# Patient Record
Sex: FEMALE | Race: BLACK OR AFRICAN AMERICAN | Employment: UNEMPLOYED | URBAN - METROPOLITAN AREA
[De-identification: names, ages, dates, MRNs, and addresses within clinical notes are randomized per-mention and may not be internally consistent; named-entity substitution may affect disease eponyms.]

---

## 2019-05-02 ENCOUNTER — APPOINTMENT (OUTPATIENT)
Dept: CT IMAGING | Age: 24
End: 2019-05-02
Payer: MEDICARE

## 2019-05-02 ENCOUNTER — HOSPITAL ENCOUNTER (EMERGENCY)
Age: 24
Discharge: HOME | End: 2019-05-03
Attending: EMERGENCY MEDICINE
Payer: MEDICARE

## 2019-05-02 DIAGNOSIS — K52.9 GASTROENTERITIS: ICD-10-CM

## 2019-05-02 DIAGNOSIS — R10.84 GENERALIZED ABDOMINAL PAIN: Primary | ICD-10-CM

## 2019-05-02 DIAGNOSIS — N83.209 CYSTIC DISEASE OF OVARIES: ICD-10-CM

## 2019-05-02 LAB
A/G RATIO: 1.4 (ref 1.1–2.2)
ALBUMIN SERPL-MCNC: 4.5 G/DL (ref 3.4–5)
ALP BLD-CCNC: 65 U/L (ref 40–129)
ALT SERPL-CCNC: 20 U/L (ref 10–40)
ANION GAP SERPL CALCULATED.3IONS-SCNC: 10 MMOL/L (ref 3–16)
AST SERPL-CCNC: 22 U/L (ref 15–37)
BACTERIA: ABNORMAL /HPF
BASOPHILS ABSOLUTE: 0 K/UL (ref 0–0.2)
BASOPHILS RELATIVE PERCENT: 0.1 %
BILIRUB SERPL-MCNC: 0.6 MG/DL (ref 0–1)
BILIRUBIN URINE: NEGATIVE
BLOOD, URINE: ABNORMAL
BUN BLDV-MCNC: 9 MG/DL (ref 7–20)
CALCIUM SERPL-MCNC: 9.4 MG/DL (ref 8.3–10.6)
CHLORIDE BLD-SCNC: 102 MMOL/L (ref 99–110)
CLARITY: CLEAR
CO2: 27 MMOL/L (ref 21–32)
COLOR: YELLOW
CREAT SERPL-MCNC: 0.8 MG/DL (ref 0.6–1.1)
EOSINOPHILS ABSOLUTE: 0 K/UL (ref 0–0.6)
EOSINOPHILS RELATIVE PERCENT: 0.1 %
EPITHELIAL CELLS, UA: ABNORMAL /HPF
GFR AFRICAN AMERICAN: >60
GFR NON-AFRICAN AMERICAN: >60
GLOBULIN: 3.2 G/DL
GLUCOSE BLD-MCNC: 88 MG/DL (ref 70–99)
GLUCOSE URINE: NEGATIVE MG/DL
HCG(URINE) PREGNANCY TEST: NEGATIVE
HCT VFR BLD CALC: 36.2 % (ref 36–48)
HEMOGLOBIN: 12.2 G/DL (ref 12–16)
KETONES, URINE: NEGATIVE MG/DL
LEUKOCYTE ESTERASE, URINE: ABNORMAL
LIPASE: 13 U/L (ref 13–60)
LYMPHOCYTES ABSOLUTE: 0.8 K/UL (ref 1–5.1)
LYMPHOCYTES RELATIVE PERCENT: 8 %
MCH RBC QN AUTO: 31 PG (ref 26–34)
MCHC RBC AUTO-ENTMCNC: 33.8 G/DL (ref 31–36)
MCV RBC AUTO: 91.7 FL (ref 80–100)
MICROSCOPIC EXAMINATION: YES
MONOCYTES ABSOLUTE: 0.4 K/UL (ref 0–1.3)
MONOCYTES RELATIVE PERCENT: 4.5 %
MUCUS: ABNORMAL /LPF
NEUTROPHILS ABSOLUTE: 8.6 K/UL (ref 1.7–7.7)
NEUTROPHILS RELATIVE PERCENT: 87.3 %
NITRITE, URINE: NEGATIVE
PDW BLD-RTO: 12.8 % (ref 12.4–15.4)
PH UA: 6.5 (ref 5–8)
PLATELET # BLD: 236 K/UL (ref 135–450)
PMV BLD AUTO: 7.8 FL (ref 5–10.5)
POTASSIUM SERPL-SCNC: 4 MMOL/L (ref 3.5–5.1)
PROTEIN UA: NEGATIVE MG/DL
RBC # BLD: 3.95 M/UL (ref 4–5.2)
RBC UA: ABNORMAL /HPF (ref 0–2)
SODIUM BLD-SCNC: 139 MMOL/L (ref 136–145)
SPECIFIC GRAVITY UA: 1.01 (ref 1–1.03)
TOTAL PROTEIN: 7.7 G/DL (ref 6.4–8.2)
URINE REFLEX TO CULTURE: YES
URINE TYPE: ABNORMAL
UROBILINOGEN, URINE: 0.2 E.U./DL
WBC # BLD: 9.8 K/UL (ref 4–11)
WBC UA: ABNORMAL /HPF (ref 0–5)

## 2019-05-02 PROCEDURE — 80053 COMPREHEN METABOLIC PANEL: CPT

## 2019-05-02 PROCEDURE — 87086 URINE CULTURE/COLONY COUNT: CPT

## 2019-05-02 PROCEDURE — 81001 URINALYSIS AUTO W/SCOPE: CPT

## 2019-05-02 PROCEDURE — 2580000003 HC RX 258: Performed by: EMERGENCY MEDICINE

## 2019-05-02 PROCEDURE — 6360000002 HC RX W HCPCS: Performed by: EMERGENCY MEDICINE

## 2019-05-02 PROCEDURE — 84703 CHORIONIC GONADOTROPIN ASSAY: CPT

## 2019-05-02 PROCEDURE — 96375 TX/PRO/DX INJ NEW DRUG ADDON: CPT

## 2019-05-02 PROCEDURE — 74177 CT ABD & PELVIS W/CONTRAST: CPT

## 2019-05-02 PROCEDURE — 99284 EMERGENCY DEPT VISIT MOD MDM: CPT

## 2019-05-02 PROCEDURE — 85025 COMPLETE CBC W/AUTO DIFF WBC: CPT

## 2019-05-02 PROCEDURE — 96374 THER/PROPH/DIAG INJ IV PUSH: CPT

## 2019-05-02 PROCEDURE — 96361 HYDRATE IV INFUSION ADD-ON: CPT

## 2019-05-02 PROCEDURE — 83690 ASSAY OF LIPASE: CPT

## 2019-05-02 PROCEDURE — 6360000002 HC RX W HCPCS

## 2019-05-02 PROCEDURE — 6360000004 HC RX CONTRAST MEDICATION: Performed by: EMERGENCY MEDICINE

## 2019-05-02 RX ORDER — MORPHINE SULFATE 4 MG/ML
4 INJECTION, SOLUTION INTRAMUSCULAR; INTRAVENOUS ONCE
Status: COMPLETED | OUTPATIENT
Start: 2019-05-02 | End: 2019-05-02

## 2019-05-02 RX ORDER — MORPHINE SULFATE 4 MG/ML
INJECTION, SOLUTION INTRAMUSCULAR; INTRAVENOUS
Status: COMPLETED
Start: 2019-05-02 | End: 2019-05-02

## 2019-05-02 RX ORDER — KETOROLAC TROMETHAMINE 30 MG/ML
30 INJECTION, SOLUTION INTRAMUSCULAR; INTRAVENOUS ONCE
Status: COMPLETED | OUTPATIENT
Start: 2019-05-02 | End: 2019-05-02

## 2019-05-02 RX ORDER — ONDANSETRON 2 MG/ML
INJECTION INTRAMUSCULAR; INTRAVENOUS
Status: COMPLETED
Start: 2019-05-02 | End: 2019-05-02

## 2019-05-02 RX ORDER — 0.9 % SODIUM CHLORIDE 0.9 %
1000 INTRAVENOUS SOLUTION INTRAVENOUS ONCE
Status: COMPLETED | OUTPATIENT
Start: 2019-05-02 | End: 2019-05-03

## 2019-05-02 RX ORDER — ONDANSETRON 2 MG/ML
4 INJECTION INTRAMUSCULAR; INTRAVENOUS ONCE
Status: COMPLETED | OUTPATIENT
Start: 2019-05-02 | End: 2019-05-02

## 2019-05-02 RX ADMIN — IOPAMIDOL 80 ML: 755 INJECTION, SOLUTION INTRAVENOUS at 23:52

## 2019-05-02 RX ADMIN — SODIUM CHLORIDE 1000 ML: 9 INJECTION, SOLUTION INTRAVENOUS at 23:13

## 2019-05-02 RX ADMIN — ONDANSETRON 4 MG: 2 INJECTION INTRAMUSCULAR; INTRAVENOUS at 23:41

## 2019-05-02 RX ADMIN — MORPHINE SULFATE 4 MG: 4 INJECTION, SOLUTION INTRAMUSCULAR; INTRAVENOUS at 23:44

## 2019-05-02 RX ADMIN — KETOROLAC TROMETHAMINE 30 MG: 30 INJECTION, SOLUTION INTRAMUSCULAR at 23:13

## 2019-05-02 ASSESSMENT — PAIN DESCRIPTION - PAIN TYPE
TYPE: ACUTE PAIN
TYPE: ACUTE PAIN

## 2019-05-02 ASSESSMENT — PAIN DESCRIPTION - PROGRESSION: CLINICAL_PROGRESSION: GRADUALLY WORSENING

## 2019-05-02 ASSESSMENT — PAIN DESCRIPTION - DESCRIPTORS
DESCRIPTORS: PRESSURE
DESCRIPTORS: CONSTANT

## 2019-05-02 ASSESSMENT — PAIN DESCRIPTION - ONSET: ONSET: ON-GOING

## 2019-05-02 ASSESSMENT — PAIN SCALES - GENERAL
PAINLEVEL_OUTOF10: 6
PAINLEVEL_OUTOF10: 6
PAINLEVEL_OUTOF10: 8
PAINLEVEL_OUTOF10: 8

## 2019-05-02 ASSESSMENT — PAIN DESCRIPTION - FREQUENCY: FREQUENCY: CONTINUOUS

## 2019-05-02 ASSESSMENT — PAIN DESCRIPTION - LOCATION
LOCATION: ABDOMEN
LOCATION: ABDOMEN

## 2019-05-02 ASSESSMENT — PAIN DESCRIPTION - ORIENTATION
ORIENTATION: MID
ORIENTATION: MID;UPPER

## 2019-05-03 VITALS
BODY MASS INDEX: 25.56 KG/M2 | RESPIRATION RATE: 16 BRPM | SYSTOLIC BLOOD PRESSURE: 113 MMHG | DIASTOLIC BLOOD PRESSURE: 61 MMHG | HEART RATE: 84 BPM | WEIGHT: 149.7 LBS | HEIGHT: 64 IN | TEMPERATURE: 99.5 F | OXYGEN SATURATION: 100 %

## 2019-05-03 PROCEDURE — 6370000000 HC RX 637 (ALT 250 FOR IP): Performed by: EMERGENCY MEDICINE

## 2019-05-03 PROCEDURE — 96361 HYDRATE IV INFUSION ADD-ON: CPT

## 2019-05-03 RX ORDER — HYDROCODONE BITARTRATE AND ACETAMINOPHEN 5; 325 MG/1; MG/1
1 TABLET ORAL EVERY 6 HOURS PRN
Qty: 8 TABLET | Refills: 0 | Status: SHIPPED | OUTPATIENT
Start: 2019-05-03 | End: 2019-05-05

## 2019-05-03 RX ORDER — DICYCLOMINE HYDROCHLORIDE 10 MG/1
20 CAPSULE ORAL ONCE
Status: COMPLETED | OUTPATIENT
Start: 2019-05-03 | End: 2019-05-03

## 2019-05-03 RX ORDER — DICYCLOMINE HCL 20 MG
20 TABLET ORAL EVERY 6 HOURS
Qty: 40 TABLET | Refills: 0 | Status: SHIPPED | OUTPATIENT
Start: 2019-05-03

## 2019-05-03 RX ORDER — ONDANSETRON 4 MG/1
4 TABLET, ORALLY DISINTEGRATING ORAL EVERY 8 HOURS PRN
Qty: 15 TABLET | Refills: 0 | Status: SHIPPED | OUTPATIENT
Start: 2019-05-03

## 2019-05-03 RX ADMIN — DICYCLOMINE HYDROCHLORIDE 20 MG: 10 CAPSULE ORAL at 01:25

## 2019-05-03 ASSESSMENT — PAIN SCALES - GENERAL: PAINLEVEL_OUTOF10: 2

## 2019-05-03 NOTE — ED PROVIDER NOTES
CHIEF COMPLAINT  Abdominal Pain (c/o Pressure to upper abd that started early this am. Pain with sitting and walking. Feels better with laying down. c/o Slight dizziness earlier and had chills. Denies any burning with urination. Finished period about 2 days ago. Has not had a BM today. )      HISTORY OF PRESENT ILLNESS  Eric Barth is a 24 y.o. female with no significant past medical history who presents to the ED complaining of abdominal pain. Patient reports that she woke up this morning with diffuse mid to upper abdominal pain. Pain has worsened throughout the day and is now diffuse, but slightly worse over the upper right side. .  Patient denies fevers but has noted some chills. No urinary symptoms. No other complaints, modifying factors or associated symptoms. I have reviewed the following from the nursing documentation. Past Medical History:   Diagnosis Date    Sinus congestion     Staph infection      Past Surgical History:   Procedure Laterality Date    SINUS SURGERY       History reviewed. No pertinent family history. Social History     Socioeconomic History    Marital status: Single     Spouse name: Not on file    Number of children: Not on file    Years of education: Not on file    Highest education level: Not on file   Occupational History    Not on file   Social Needs    Financial resource strain: Not on file    Food insecurity:     Worry: Not on file     Inability: Not on file    Transportation needs:     Medical: Not on file     Non-medical: Not on file   Tobacco Use    Smoking status: Never Smoker    Smokeless tobacco: Never Used   Substance and Sexual Activity    Alcohol use:  Yes    Drug use: No    Sexual activity: Not on file   Lifestyle    Physical activity:     Days per week: Not on file     Minutes per session: Not on file    Stress: Not on file   Relationships    Social connections:     Talks on phone: Not on file     Gets together: Not on file     Attends Yazidism service: Not on file     Active member of club or organization: Not on file     Attends meetings of clubs or organizations: Not on file     Relationship status: Not on file    Intimate partner violence:     Fear of current or ex partner: Not on file     Emotionally abused: Not on file     Physically abused: Not on file     Forced sexual activity: Not on file   Other Topics Concern    Not on file   Social History Narrative    Not on file     Current Facility-Administered Medications   Medication Dose Route Frequency Provider Last Rate Last Dose    ketorolac (TORADOL) injection 30 mg  30 mg Intravenous Once Angle Mariela, DO        0.9 % sodium chloride bolus  1,000 mL Intravenous Once Angle Mariela, DO        iopamidol (ISOVUE-370) 76 % injection 80 mL  80 mL Intravenous ONCE PRN Angle Springer, DO         No current outpatient medications on file. No Known Allergies    REVIEW OF SYSTEMS  10 systems reviewed, pertinent positives per HPI otherwise noted to be negative. PHYSICAL EXAM  /66   Pulse 103   Temp 98.5 °F (36.9 °C) (Oral)   Resp 16   Ht 5' 4\" (1.626 m)   Wt 67.9 kg (149 lb 11.2 oz)   LMP 04/25/2019   SpO2 100%   BMI 25.70 kg/m²   GENERAL APPEARANCE: Awake and alert. Cooperative. No acute distress. HEAD: Normocephalic. Atraumatic. EYES: PERRL. EOM's grossly intact. ENT: Mucous membranes are moist.   NECK: Supple, trachea midline. HEART: RRR. Normal S1S2, no rubs, gallops, or murmurs noted  LUNGS: Respirations unlabored. CTAB. Good air exchange. No wheezes, rales, or rhonchi. Speaking comfortably in full sentences. ABDOMEN: Soft. Non-distended. Diffuse upper/right-sided abdominal pain. This is worse in the right upper quadrant. No CVA tenderness. No guarding or rebound. Normal bowel sounds. EXTREMITIES: No peripheral edema. MAEE. No acute deformities. SKIN: Warm and dry. No acute rashes. NEUROLOGICAL: Alert and oriented X 3. CN II-XII intact.  No gross facial drooping. Strength 5/5, sensation intact. Normal coordination. No pronator drift. Gait normal.   PSYCHIATRIC: Normal mood and affect. LABS  I have reviewed all labs for this visit.    Results for orders placed or performed during the hospital encounter of 05/02/19   Pregnancy, Urine   Result Value Ref Range    HCG(Urine) Pregnancy Test Negative Detects HCG level >20 MIU/mL   Urine Reflex to Culture   Result Value Ref Range    Color, UA Yellow Straw/Yellow    Clarity, UA Clear Clear    Glucose, Ur Negative Negative mg/dL    Bilirubin Urine Negative Negative    Ketones, Urine Negative Negative mg/dL    Specific Gravity, UA 1.010 1.005 - 1.030    Blood, Urine TRACE-INTACT (A) Negative    pH, UA 6.5 5.0 - 8.0    Protein, UA Negative Negative mg/dL    Urobilinogen, Urine 0.2 <2.0 E.U./dL    Nitrite, Urine Negative Negative    Leukocyte Esterase, Urine TRACE (A) Negative    Microscopic Examination YES     Urine Reflex to Culture Yes     Urine Type Not Specified    Microscopic Urinalysis   Result Value Ref Range    Mucus, UA 1+ (A) /LPF    WBC, UA 6-10 (A) 0 - 5 /HPF    RBC, UA 3-5 (A) 0 - 2 /HPF    Epi Cells 20-50 /HPF    Bacteria, UA 1+ (A) /HPF   CBC Auto Differential   Result Value Ref Range    WBC 9.8 4.0 - 11.0 K/uL    RBC 3.95 (L) 4.00 - 5.20 M/uL    Hemoglobin 12.2 12.0 - 16.0 g/dL    Hematocrit 36.2 36.0 - 48.0 %    MCV 91.7 80.0 - 100.0 fL    MCH 31.0 26.0 - 34.0 pg    MCHC 33.8 31.0 - 36.0 g/dL    RDW 12.8 12.4 - 15.4 %    Platelets 609 001 - 353 K/uL    MPV 7.8 5.0 - 10.5 fL    Neutrophils % 87.3 %    Lymphocytes % 8.0 %    Monocytes % 4.5 %    Eosinophils % 0.1 %    Basophils % 0.1 %    Neutrophils # 8.6 (H) 1.7 - 7.7 K/uL    Lymphocytes # 0.8 (L) 1.0 - 5.1 K/uL    Monocytes # 0.4 0.0 - 1.3 K/uL    Eosinophils # 0.0 0.0 - 0.6 K/uL    Basophils # 0.0 0.0 - 0.2 K/uL   Lipase   Result Value Ref Range    Lipase 13.0 13.0 - 60.0 U/L   Comprehensive Metabolic Panel   Result Value Ref Range    Sodium 139 136 - 145 mmol/L    Potassium 4.0 3.5 - 5.1 mmol/L    Chloride 102 99 - 110 mmol/L    CO2 27 21 - 32 mmol/L    Anion Gap 10 3 - 16    Glucose 88 70 - 99 mg/dL    BUN 9 7 - 20 mg/dL    CREATININE 0.8 0.6 - 1.1 mg/dL    GFR Non-African American >60 >60    GFR African American >60 >60    Calcium 9.4 8.3 - 10.6 mg/dL    Total Protein 7.7 6.4 - 8.2 g/dL    Alb 4.5 3.4 - 5.0 g/dL    Albumin/Globulin Ratio 1.4 1.1 - 2.2    Total Bilirubin 0.6 0.0 - 1.0 mg/dL    Alkaline Phosphatase 65 40 - 129 U/L    ALT 20 10 - 40 U/L    AST 22 15 - 37 U/L    Globulin 3.2 g/dL         RADIOLOGY  X-RAYS:  I have reviewed radiologic plain film image(s). ALL OTHER NON-PLAIN FILM IMAGES SUCH AS CT, ULTRASOUND AND MRI HAVE BEEN READ BY THE RADIOLOGIST. CT ABDOMEN PELVIS W IV CONTRAST Additional Contrast? None   Final Result     Fluid within nondistended loops of small bowel and within stomach    without bowel wall thickening or surrounding inflammation can be normal    or can be seen with gastroenteritis in the right clinical setting. Asymmetric cystic ovaries, right larger than left. Correlate for any    potential associated pain on the right side. Ultrasound may provide    additional useful information if concerned clinically. No acute or inflammatory disease or bowel obstruction. Rechecks: Physical assessment performed. 2313: Pain 6/10.    0100: Patient resting comfortably. ED COURSE/MDM  Patient seen and evaluated. Old records reviewed. Labs and imaging reviewed and results discussed with patient. Patient was given Toradol, morphine, Zofran, and Bentyl in the ED with good symptomatic relief. Patient was reassessed as noted above . Patient's labs appear stable. Imaging reveals concern for possible gastroenteritis. Clinically, this is most consistent with patient's current symptoms and diffuse pain.   We did discuss her CT results and concern for cystic ovaries as well as need for follow-up with ObGyn. We also discussed the possibility of developing biliary disease though her liver enzymes and pancreatic enzymes appear stable and there are no CT signs to suggest this as the etiology at this time. No acute pathology was noted and pt is safe for discharge home to follow up with PCP/ObGyn. Plan of care discussed with patient and family. Patient and family in agreement with plan. Patient was given scripts for the following medications. I counseled patient how to take these medications. New Prescriptions    DICYCLOMINE (BENTYL) 20 MG TABLET    Take 1 tablet by mouth every 6 hours    HYDROCODONE-ACETAMINOPHEN (NORCO) 5-325 MG PER TABLET    Take 1 tablet by mouth every 6 hours as needed for Pain for up to 2 days. ONDANSETRON (ZOFRAN ODT) 4 MG DISINTEGRATING TABLET    Take 1 tablet by mouth every 8 hours as needed for Nausea or Vomiting       CLINICAL IMPRESSION  1. Generalized abdominal pain    2. Gastroenteritis    3. Cystic disease of ovaries        Blood pressure 114/66, pulse 103, temperature 98.5 °F (36.9 °C), temperature source Oral, resp. rate 16, height 5' 4\" (1.626 m), weight 67.9 kg (149 lb 11.2 oz), last menstrual period 04/25/2019, SpO2 100 %, not currently breastfeeding. DISPOSITION  Danny Jesse was discharged to home in stable condition.         Sravanthi Okeefe DO  05/03/19 Maricruz Mcconnell

## 2019-05-04 LAB
ORGANISM: ABNORMAL
URINE CULTURE, ROUTINE: ABNORMAL
URINE CULTURE, ROUTINE: ABNORMAL

## 2021-06-22 ENCOUNTER — HOSPITAL ENCOUNTER (EMERGENCY)
Age: 26
Discharge: HOME | End: 2021-06-22
Payer: MEDICARE

## 2021-06-22 VITALS
HEIGHT: 65 IN | RESPIRATION RATE: 16 BRPM | TEMPERATURE: 97.9 F | WEIGHT: 156 LBS | SYSTOLIC BLOOD PRESSURE: 105 MMHG | HEART RATE: 77 BPM | BODY MASS INDEX: 25.99 KG/M2 | DIASTOLIC BLOOD PRESSURE: 64 MMHG | OXYGEN SATURATION: 99 %

## 2021-06-22 DIAGNOSIS — L03.012 PARONYCHIA OF FINGER OF LEFT HAND: Primary | ICD-10-CM

## 2021-06-22 DIAGNOSIS — S05.02XA ABRASION OF LEFT CORNEA, INITIAL ENCOUNTER: ICD-10-CM

## 2021-06-22 PROCEDURE — 99283 EMERGENCY DEPT VISIT LOW MDM: CPT

## 2021-06-22 PROCEDURE — 6370000000 HC RX 637 (ALT 250 FOR IP): Performed by: PHYSICIAN ASSISTANT

## 2021-06-22 RX ORDER — SULFAMETHOXAZOLE AND TRIMETHOPRIM 800; 160 MG/1; MG/1
1 TABLET ORAL 2 TIMES DAILY
Qty: 14 TABLET | Refills: 0 | Status: SHIPPED | OUTPATIENT
Start: 2021-06-22 | End: 2021-06-29

## 2021-06-22 RX ORDER — FLUCONAZOLE 150 MG/1
150 TABLET ORAL ONCE
Qty: 1 TABLET | Refills: 0 | Status: SHIPPED | OUTPATIENT
Start: 2021-06-22 | End: 2021-06-22

## 2021-06-22 RX ORDER — CEPHALEXIN 500 MG/1
500 CAPSULE ORAL 4 TIMES DAILY
Qty: 28 CAPSULE | Refills: 0 | Status: SHIPPED | OUTPATIENT
Start: 2021-06-22 | End: 2021-06-29

## 2021-06-22 RX ORDER — TETRACAINE HYDROCHLORIDE 5 MG/ML
1 SOLUTION OPHTHALMIC ONCE
Status: COMPLETED | OUTPATIENT
Start: 2021-06-22 | End: 2021-06-22

## 2021-06-22 RX ORDER — ERYTHROMYCIN 5 MG/G
OINTMENT OPHTHALMIC ONCE
Status: COMPLETED | OUTPATIENT
Start: 2021-06-22 | End: 2021-06-22

## 2021-06-22 RX ORDER — ERYTHROMYCIN 5 MG/G
OINTMENT OPHTHALMIC
Qty: 3.5 G | Refills: 0 | Status: SHIPPED | OUTPATIENT
Start: 2021-06-22 | End: 2021-07-02

## 2021-06-22 RX ADMIN — TETRACAINE HYDROCHLORIDE 1 DROP: 5 SOLUTION OPHTHALMIC at 11:47

## 2021-06-22 RX ADMIN — ERYTHROMYCIN: 5 OINTMENT OPHTHALMIC at 12:39

## 2021-06-22 RX ADMIN — FLUORESCEIN SODIUM 1 MG: 1 STRIP OPHTHALMIC at 11:46

## 2021-06-22 ASSESSMENT — PAIN DESCRIPTION - DESCRIPTORS: DESCRIPTORS: SHARP

## 2021-06-22 ASSESSMENT — PAIN DESCRIPTION - ONSET: ONSET: SUDDEN

## 2021-06-22 ASSESSMENT — PAIN DESCRIPTION - ORIENTATION: ORIENTATION: LEFT

## 2021-06-22 ASSESSMENT — PAIN DESCRIPTION - PROGRESSION: CLINICAL_PROGRESSION: GRADUALLY WORSENING

## 2021-06-22 ASSESSMENT — ENCOUNTER SYMPTOMS
EYE ITCHING: 0
BACK PAIN: 0
ABDOMINAL PAIN: 0
COLOR CHANGE: 1
EYE DISCHARGE: 0
SHORTNESS OF BREATH: 0
NAUSEA: 0
CHEST TIGHTNESS: 0
EYE REDNESS: 1
FACIAL SWELLING: 0
VOMITING: 0
EYE PAIN: 1
PHOTOPHOBIA: 0

## 2021-06-22 ASSESSMENT — PAIN - FUNCTIONAL ASSESSMENT: PAIN_FUNCTIONAL_ASSESSMENT: PREVENTS OR INTERFERES SOME ACTIVE ACTIVITIES AND ADLS

## 2021-06-22 ASSESSMENT — PAIN SCALES - GENERAL: PAINLEVEL_OUTOF10: 10

## 2021-06-22 ASSESSMENT — PAIN DESCRIPTION - LOCATION: LOCATION: EYE

## 2021-06-22 ASSESSMENT — PAIN DESCRIPTION - PAIN TYPE: TYPE: ACUTE PAIN

## 2021-06-22 NOTE — ED PROVIDER NOTES
[address] ENCOUNTER          PHYSICIAN ASSISTANT NOTE       Date of evaluation: 6/22/2021    Chief Complaint     Eye Pain (eye injury on 6/21) and Finger Injury (left pinky)    History of Present Illness     Umesh Yates is a 31 y.o. female with no known past medical history presenting to the emergency department for two separate complaints. First concern is that on Saturday night her acrylic nail pulled off of her normal nail on her left pinky finger. the following day she had increased pain on the lateral aspect of the nailbed and over the last few days she has had small amount of clear/purulent drainage from the edge of the nail with associated pain on the fingertip. No known trauma or blunt injury. No pain radiating into the finger or hand. Yesterday she was taking off the rest of her acrylic nails when a nail popped off and hit her in the left eye. She has had discomfort in the eye since this injury occurred. She wears glasses but is not currently wearing them, no corrective or cosmetic lenses. Last night she had difficulty sleeping due to eye discomfort, attempted to flush her eye without improvement. Mild blurry vision in the left eye, no symptoms in the right eye. No associated photophobia. Review of Systems     Review of Systems   Constitutional: Negative for chills, diaphoresis, fatigue and fever. HENT: Negative for facial swelling. Eyes: Positive for pain, redness and visual disturbance. Negative for photophobia, discharge and itching. Respiratory: Negative for chest tightness and shortness of breath. Cardiovascular: Negative for chest pain. Gastrointestinal: Negative for abdominal pain, nausea and vomiting. Musculoskeletal: Negative for arthralgias, back pain and myalgias. Skin: Positive for color change (left pinky finger). Neurological: Negative for facial asymmetry, weakness and headaches. Psychiatric/Behavioral: Negative for confusion. Past Medical, Surgical, Family, and Social History     She has a past medical history of Sinus congestion and Staph infection. She has a past surgical history that includes sinus surgery. Her family history is not on file. She reports that she has never smoked. She has never used smokeless tobacco. She reports current alcohol use. She reports that she does not use drugs. Medications     Discharge Medication List as of 6/22/2021 12:46 PM      CONTINUE these medications which have NOT CHANGED    Details   dicyclomine (BENTYL) 20 MG tablet Take 1 tablet by mouth every 6 hours, Disp-40 tablet, R-0Print      ondansetron (ZOFRAN ODT) 4 MG disintegrating tablet Take 1 tablet by mouth every 8 hours as needed for Nausea or Vomiting, Disp-15 tablet, R-0Print             Allergies     She is allergic to cat hair extract. Physical Exam     INITIAL VITALS: BP: 101/63, Temp: 97.9 °F (36.6 °C), Pulse: 77, Resp: 14, SpO2: 99 %  Physical Exam  Vitals and nursing note reviewed. Constitutional:       General: She is not in acute distress. Appearance: Normal appearance. She is normal weight. She is not ill-appearing, toxic-appearing or diaphoretic. HENT:      Head: Normocephalic. Eyes:      General:         Right eye: No discharge. Left eye: No discharge. Extraocular Movements: Extraocular movements intact. Pupils: Pupils are equal, round, and reactive to light. Comments: Left eye 20/50  Right eye 20/25  Both eyes 20/25  Visual fields normal to confrontation. External exam revealed no surrounding soft tissue, upper and lower sulcus free  of foreign body, no exophthalmos or enophthalmos, conjugate gaze. Extraocular movements were intact  PERRL   Area of uptake consistent with corneal abrasion over the center of the visual field with  No foreign body, no evidence of open globe with negative debbie sign  Slit-lamp exam without additional findings in lids/lashes or sclera/conjunctiva. Dispense:  14 tablet     Refill:  0    cephALEXin (KEFLEX) 500 MG capsule     Sig: Take 1 capsule by mouth 4 times daily for 7 days     Dispense:  28 capsule     Refill:  0    erythromycin (ROMYCIN) ophthalmic ointment    erythromycin (ROMYCIN) 5 MG/GM ophthalmic ointment     Sig: Place 0.5 inch ribbon on left eye every 6 hours x 5 days     Dispense:  3.5 g     Refill:  0    fluconazole (DIFLUCAN) 150 MG tablet     Sig: Take 1 tablet by mouth once for 1 dose     Dispense:  1 tablet     Refill:  0       CONSULTS:  None    MEDICAL DECISION MAKING / ASSESSMENT / Sanjeev Andrei is a 31 y.o. female presenting to the emergency department for evaluation of left eye irritation as well as pain at the aspect of the distal portion of the left pinky finger. Upon presentation, she was well in appearance, vitals were stable and she was nontoxic in appearance. On exam, patient had an area of fluctuance and erythema consistent with paronychia on the ulnar aspect of the left pinky finger. No erythema or swelling extending into the proximal aspect of the digit, range of motion of the finger normal with flexion and full extension, no tenderness over the flexor tendon sheath or evidence of flexor tenosynovitis or felon. The paronychia spontaneously drained with slight pressure overlying the area of fluctuance. That she would benefit from a course of oral antibiotics, and was given a prescription for Bactrim and Keflex, encouraged to do warm soapy soaks for the next few days, with strict return precautions and follow-up instructions. In addition, patient had trauma to the left eye after an acrylic nail hit her in the center of her visual field of her left eye yesterday. Exam is consistent with corneal abrasion overlying the center of her pupil. No evidence of open globe, she had no findings to suggest traumatic iritis. No evidence of acute ear infection.   Slit-lamp exam, able to identify corneal abrasion with no foreign body, no other abnormalities noted. She was given erythromycin eye ointment for comfort and to prevent infection, and strongly encouraged to follow-up with ophthalmology on an outpatient basis based off of the location of the abrasion. Patient is in agreement with this plan. This patient was also evaluated independently by [facility]    Clinical Impression     1. Paronychia of finger of left hand    2. Abrasion of left cornea, initial encounter        Disposition     PATIENT REFERRED TO:  No follow-up provider specified.     DISCHARGE MEDICATIONS:  Discharge Medication List as of 6/22/2021 12:46 PM      START taking these medications    Details   sulfamethoxazole-trimethoprim (BACTRIM DS) 800-160 MG per tablet Take 1 tablet by mouth 2 times daily for 7 days, Disp-14 tablet, R-0Print      cephALEXin (KEFLEX) 500 MG capsule Take 1 capsule by mouth 4 times daily for 7 days, Disp-28 capsule, R-0Print      erythromycin (ROMYCIN) 5 MG/GM ophthalmic ointment Place 0.5 inch ribbon on left eye every 6 hours x 5 days, Disp-3.5 g, R-0, Print      fluconazole (DIFLUCAN) 150 MG tablet Take 1 tablet by mouth once for 1 dose, Disp-1 tablet, R-0Print             DISPOSITION Decision To Discharge 06/22/2021 12:11:15 PM       Jacinto Cole PA-C  06/22/21 [id number]

## 2021-06-22 NOTE — ED TRIAGE NOTES
Pt presents to ED with left eye pain after scratching eye on 6/21. Pt also c/o left pinky pain due to acrylic nails being pulled off.

## 2021-06-22 NOTE — FLOWSHEET NOTE
06/22/21 1250   Encounter Summary   Services provided to: Patient   Referral/Consult From: Yimi   Continue Visiting   (6/22/21, CHAMP. )   Complexity of Encounter Moderate   Length of Encounter 15 minutes   Routine   Type Initial   Assessment Approachable   Intervention Nurtured hope   Outcome Expressed gratitude

## 2021-06-22 NOTE — ED NOTES
Visual acuity done  Left eye 20/50  Right eye 20/25  Both eyes 20/25     Rolan Hasnon RN  06/22/21 [id number]